# Patient Record
Sex: MALE | Race: WHITE
[De-identification: names, ages, dates, MRNs, and addresses within clinical notes are randomized per-mention and may not be internally consistent; named-entity substitution may affect disease eponyms.]

---

## 2017-11-20 ENCOUNTER — APPOINTMENT (OUTPATIENT)
Dept: FAMILY MEDICINE | Facility: CLINIC | Age: 32
End: 2017-11-20
Payer: COMMERCIAL

## 2017-11-20 VITALS
BODY MASS INDEX: 29.12 KG/M2 | SYSTOLIC BLOOD PRESSURE: 142 MMHG | WEIGHT: 215 LBS | HEIGHT: 72 IN | DIASTOLIC BLOOD PRESSURE: 90 MMHG

## 2017-11-20 PROCEDURE — 99213 OFFICE O/P EST LOW 20 MIN: CPT

## 2018-03-22 ENCOUNTER — TRANSCRIPTION ENCOUNTER (OUTPATIENT)
Age: 33
End: 2018-03-22

## 2018-03-29 ENCOUNTER — APPOINTMENT (OUTPATIENT)
Dept: FAMILY MEDICINE | Facility: CLINIC | Age: 33
End: 2018-03-29
Payer: COMMERCIAL

## 2018-03-29 VITALS
SYSTOLIC BLOOD PRESSURE: 140 MMHG | BODY MASS INDEX: 29.12 KG/M2 | HEIGHT: 72 IN | HEART RATE: 99 BPM | WEIGHT: 215 LBS | OXYGEN SATURATION: 93 % | DIASTOLIC BLOOD PRESSURE: 90 MMHG

## 2018-03-29 DIAGNOSIS — J40 BRONCHITIS, NOT SPECIFIED AS ACUTE OR CHRONIC: ICD-10-CM

## 2018-03-29 DIAGNOSIS — R05 COUGH: ICD-10-CM

## 2018-03-29 DIAGNOSIS — J98.01 ACUTE BRONCHOSPASM: ICD-10-CM

## 2018-03-29 PROCEDURE — 99214 OFFICE O/P EST MOD 30 MIN: CPT

## 2018-03-29 RX ORDER — CALCIPOTRIENE AND BETAMETHASONE DIPROPIONATE 50; .5 UG/G; MG/G
0.005-0.064 AEROSOL, FOAM TOPICAL
Qty: 60 | Refills: 0 | Status: COMPLETED | COMMUNITY
Start: 2017-11-14

## 2018-03-29 RX ORDER — PREDNISONE 20 MG/1
20 TABLET ORAL
Qty: 12 | Refills: 0 | Status: DISCONTINUED | COMMUNITY
Start: 2017-11-20 | End: 2018-03-29

## 2018-03-29 RX ORDER — HYDROCODONE BITARTRATE AND HOMATROPINE METHYLBROMIDE 5; 1.5 MG/5ML; MG/5ML
5-1.5 SOLUTION ORAL
Qty: 120 | Refills: 0 | Status: DISCONTINUED | COMMUNITY
Start: 2017-11-20 | End: 2018-03-29

## 2018-03-29 RX ORDER — AZITHROMYCIN 250 MG/1
250 TABLET, FILM COATED ORAL
Qty: 1 | Refills: 0 | Status: DISCONTINUED | COMMUNITY
Start: 2017-11-20 | End: 2018-03-29

## 2018-06-25 ENCOUNTER — TRANSCRIPTION ENCOUNTER (OUTPATIENT)
Age: 33
End: 2018-06-25

## 2018-07-06 ENCOUNTER — TRANSCRIPTION ENCOUNTER (OUTPATIENT)
Age: 33
End: 2018-07-06

## 2019-07-28 ENCOUNTER — TRANSCRIPTION ENCOUNTER (OUTPATIENT)
Age: 34
End: 2019-07-28

## 2020-01-26 ENCOUNTER — TRANSCRIPTION ENCOUNTER (OUTPATIENT)
Age: 35
End: 2020-01-26

## 2020-01-30 ENCOUNTER — TRANSCRIPTION ENCOUNTER (OUTPATIENT)
Age: 35
End: 2020-01-30

## 2022-09-24 ENCOUNTER — NON-APPOINTMENT (OUTPATIENT)
Age: 37
End: 2022-09-24

## 2024-02-23 ENCOUNTER — NON-APPOINTMENT (OUTPATIENT)
Age: 39
End: 2024-02-23

## 2024-02-23 ENCOUNTER — APPOINTMENT (OUTPATIENT)
Dept: CARDIOLOGY | Facility: CLINIC | Age: 39
End: 2024-02-23
Payer: COMMERCIAL

## 2024-02-23 VITALS — WEIGHT: 251 LBS | HEIGHT: 72 IN | BODY MASS INDEX: 34 KG/M2

## 2024-02-23 VITALS — HEART RATE: 90 BPM | OXYGEN SATURATION: 96 % | SYSTOLIC BLOOD PRESSURE: 142 MMHG | DIASTOLIC BLOOD PRESSURE: 92 MMHG

## 2024-02-23 DIAGNOSIS — R03.0 ELEVATED BLOOD-PRESSURE READING, W/OUT DIAGNOSIS OF HYPERTENSION: ICD-10-CM

## 2024-02-23 DIAGNOSIS — Z84.0 FAMILY HISTORY OF DISEASES OF THE SKIN AND SUBCUTANEOUS TISSUE: ICD-10-CM

## 2024-02-23 DIAGNOSIS — Z81.8 FAMILY HISTORY OF OTHER MENTAL AND BEHAVIORAL DISORDERS: ICD-10-CM

## 2024-02-23 DIAGNOSIS — Z87.442 PERSONAL HISTORY OF URINARY CALCULI: ICD-10-CM

## 2024-02-23 DIAGNOSIS — Z78.9 OTHER SPECIFIED HEALTH STATUS: ICD-10-CM

## 2024-02-23 DIAGNOSIS — E66.9 OBESITY, UNSPECIFIED: ICD-10-CM

## 2024-02-23 DIAGNOSIS — Z87.2 PERSONAL HISTORY OF DISEASES OF THE SKIN AND SUBCUTANEOUS TISSUE: ICD-10-CM

## 2024-02-23 PROCEDURE — 93000 ELECTROCARDIOGRAM COMPLETE: CPT

## 2024-02-23 PROCEDURE — 99204 OFFICE O/P NEW MOD 45 MIN: CPT | Mod: 25

## 2024-02-23 RX ORDER — AZITHROMYCIN 250 MG/1
250 TABLET, FILM COATED ORAL
Qty: 1 | Refills: 0 | Status: DISCONTINUED | COMMUNITY
Start: 2018-03-29 | End: 2024-02-23

## 2024-02-23 RX ORDER — BROMPHENIRAMINE MALEATE, PSEUDOEPHEDRINE HYDROCHLORIDE, 2; 30; 10 MG/5ML; MG/5ML; MG/5ML
30-2-10 SYRUP ORAL
Qty: 210 | Refills: 0 | Status: DISCONTINUED | COMMUNITY
Start: 2018-03-22 | End: 2024-02-23

## 2024-02-23 RX ORDER — MONTELUKAST 10 MG/1
10 TABLET, FILM COATED ORAL
Qty: 14 | Refills: 0 | Status: DISCONTINUED | COMMUNITY
Start: 2018-03-22 | End: 2024-02-23

## 2024-02-23 RX ORDER — ALBUTEROL SULFATE 90 UG/1
108 (90 BASE) AEROSOL, METERED RESPIRATORY (INHALATION)
Qty: 8 | Refills: 0 | Status: DISCONTINUED | COMMUNITY
Start: 2018-03-22 | End: 2024-02-23

## 2024-02-23 RX ORDER — PREDNISONE 20 MG/1
20 TABLET ORAL
Qty: 12 | Refills: 0 | Status: DISCONTINUED | COMMUNITY
Start: 2018-03-29 | End: 2024-02-23

## 2024-02-23 RX ORDER — FLUTICASONE PROPION/SALMETEROL 100-50 MCG
100-50 BLISTER, WITH INHALATION DEVICE INHALATION
Refills: 0 | Status: ACTIVE | COMMUNITY

## 2024-02-23 NOTE — PHYSICAL EXAM
[Well Developed] : well developed [Normal Conjunctiva] : normal conjunctiva [Well Nourished] : well nourished [No Acute Distress] : no acute distress [Normal S1, S2] : normal S1, S2 [Normal Venous Pressure] : normal venous pressure [No Murmur] : no murmur [No Gallop] : no gallop [Clear Lung Fields] : clear lung fields [Good Air Entry] : good air entry [Soft] : abdomen soft [No Respiratory Distress] : no respiratory distress  [Non Tender] : non-tender [Normal Bowel Sounds] : normal bowel sounds [No Edema] : no edema [Normal Gait] : normal gait [No Varicosities] : no varicosities [No Cyanosis] : no cyanosis [No Rash] : no rash [No Skin Lesions] : no skin lesions [Moves all extremities] : moves all extremities [No Focal Deficits] : no focal deficits [Normal Speech] : normal speech [Alert and Oriented] : alert and oriented [Normal memory] : normal memory

## 2024-03-12 ENCOUNTER — APPOINTMENT (OUTPATIENT)
Dept: CARDIOLOGY | Facility: CLINIC | Age: 39
End: 2024-03-12
Payer: COMMERCIAL

## 2024-03-12 PROCEDURE — 93306 TTE W/DOPPLER COMPLETE: CPT

## 2024-03-14 NOTE — DISCUSSION/SUMMARY
[FreeTextEntry1] : 38 year old man with a history of snoring after gaining weight during Covid.  There has been a concern for sleep apnea but he has never been tested nor diagnosed.  He notes recent elevations in blood pressure for several medical visits.  He notes that he has a chronic cough.  Given the elevated BP, will assess for LVH and structural heart disease with an echocardiogrm.  If evidence of LVH, will need to start BP medications. Will obtain labs as above Zepbound for weight loss and follow up in 6 months.

## 2024-03-14 NOTE — HISTORY OF PRESENT ILLNESS
[FreeTextEntry1] : 38 year old man with a history of snoring after gaining weight during Covid.  There has been a concern for sleep apnea but he has never been tested nor diagnosed.  He notes recent elevations in blood pressure for several medical visits.  He notes that he has a chronic cough.  He is active with no chest pain, dyspnea or palpitations.  He is studying to be a .  He has not been working out routinely since the onset of the pandemic.

## 2024-03-14 NOTE — REVIEW OF SYSTEMS
[Weight Gain (___ Lbs)] : [unfilled] ~Ulb weight gain [Cough] : cough [Negative] : Heme/Lymph [FreeTextEntry4] : seasonal allergies [FreeTextEntry7] : GERD [de-identified] : psoriasis

## 2024-05-06 DIAGNOSIS — I10 ESSENTIAL (PRIMARY) HYPERTENSION: ICD-10-CM

## 2024-05-06 RX ORDER — TIRZEPATIDE 2.5 MG/.5ML
2.5 INJECTION, SOLUTION SUBCUTANEOUS
Qty: 1 | Refills: 3 | Status: ACTIVE | COMMUNITY
Start: 2024-02-23 | End: 1900-01-01

## 2024-06-11 ENCOUNTER — EMERGENCY (EMERGENCY)
Facility: HOSPITAL | Age: 39
LOS: 0 days | Discharge: HOME | End: 2024-06-11
Attending: EMERGENCY MEDICINE
Payer: COMMERCIAL

## 2024-06-11 VITALS
DIASTOLIC BLOOD PRESSURE: 110 MMHG | SYSTOLIC BLOOD PRESSURE: 182 MMHG | TEMPERATURE: 98 F | RESPIRATION RATE: 18 BRPM | OXYGEN SATURATION: 99 % | HEART RATE: 107 BPM | WEIGHT: 235.01 LBS | HEIGHT: 72 IN
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
## 2024-06-11 DIAGNOSIS — I10 ESSENTIAL (PRIMARY) HYPERTENSION: ICD-10-CM

## 2024-06-11 DIAGNOSIS — R19.09 OTHER INTRA-ABDOMINAL AND PELVIC SWELLING, MASS AND LUMP: ICD-10-CM
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
## 2024-06-11 DIAGNOSIS — M62.08 SEPARATION OF MUSCLE (NONTRAUMATIC), OTHER SITE: ICD-10-CM

## 2024-06-11 PROCEDURE — 99282 EMERGENCY DEPT VISIT SF MDM: CPT

## 2024-06-11 PROCEDURE — 99053 MED SERV 10PM-8AM 24 HR FAC: CPT

## 2024-06-11 PROCEDURE — 99283 EMERGENCY DEPT VISIT LOW MDM: CPT

## 2024-06-11 NOTE — ED PROVIDER NOTE - CARE PROVIDER_API CALL
Jose Guillermo  Surgery  [address] [place] [id number]  Phone: [phone]  Fax: [phone]  Follow Up Time: 7-10 Days

## 2024-06-11 NOTE — ED PROVIDER NOTE - CLINICAL SUMMARY MEDICAL DECISION MAKING FREE TEXT BOX
Patient with diastasis recti, no signs of obstruction, no indication for imaging, labs, treatment at this time.    Will dc with surgical and PMD follow up.

## 2024-06-11 NOTE — ED PROVIDER NOTE - OBJECTIVE STATEMENT
38 yo M hx of borderline HTN, here for assessment of swelling to mid abdomen -- patient states that he was lifting something heavy at work, felt an abnormal sensation in his abdomen and now notes swelling to mid abdomen when he lays flat and flexes his abdominal muscles.    No pain, nausea, vomiting, constipation.

## 2024-06-11 NOTE — ED PROVIDER NOTE - PATIENT PORTAL LINK FT
You can access the FollowMyHealth Patient Portal offered by [facility] by registering at the following website: [email or web address]. By joining [facility]’s FollowMyHealth portal, you will also be able to view your health information using other applications (apps) compatible with our system.

## 2024-06-11 NOTE — ED PROVIDER NOTE - PHYSICAL EXAMINATION
VITAL SIGNS: I have reviewed nursing notes and confirm.  CONSTITUTIONAL: Well-developed; well-nourished; in no acute distress.  SKIN: Skin exam is warm and dry, no acute rash.  HEAD: Normocephalic; atraumatic.  EYES: PERRL, EOM intact; conjunctiva and sclera clear.  ENT: No nasal discharge; airway clear.   NECK: Supple; non tender.  CARD: S1, S2 normal; no murmurs, gallops, or rubs. Regular rate and rhythm.  RESP: No wheezes, rales or rhonchi.  ABD: Normal bowel sounds; soft; non-distended; non-tender; has visible diastasis when flexing abdomen  EXT: Normal ROM.  NEURO: Alert, oriented. Grossly unremarkable. No focal deficits.  PSYCH: Cooperative, appropriate.

## 2024-06-11 NOTE — ED PROVIDER NOTE - NSFOLLOWUPINSTRUCTIONS_ED_ALL_ED_FT
Diastasis Recti    Diastasis recti is a condition in which the muscles of the abdomen (rectus abdominis muscles) become thin and separate. The result is a wider space between the muscles of the right and left abdomen (abdominal muscles). This wider space between the muscles may cause a bulge in the middle of the abdomen. This bulge may be noticed when a person is straining or when he or she sits up after lying down.    Diastasis recti can affect men and women. It is most common among pregnant women, babies, people with obesity, and people who have had abdominal surgery. Exercise or surgery may help correct this condition.    What are the causes?  Common causes of this condition include:  Pregnancy. As the uterus grows in size, it puts pressure on the abdominal muscles, causing the muscles to separate.  Obesity. Excess fat puts pressure on abdominal muscles.  Weight lifting.  Some exercises of the abdomen.  Advanced age.  Genetics.  Having had surgery on the abdomen before.  What increases the risk?  This condition is more likely to develop in:  Women.  Newborns, especially newborns who are born early (prematurely).  What are the signs or symptoms?  Common symptoms of this condition include:  A bulge in the middle of your abdomen. You will notice it most when you sit up or strain.  Pain in your low back, hips, or the area between your hip bones (pelvis).  Constipation.  Being unable to control when you urinate (urinary incontinence).  Bloating.  Poor posture.  How is this diagnosed?  This condition is diagnosed with a physical exam. During the exam, your health care provider will ask you to lie flat on your back and do a crunch or half sit-up. If you have diastasis recti, a bulge will appear lengthwise between your abdominal muscles in the center of your abdomen. Your health care provider will measure the gap between your muscles with one of the following:  A medical device used to measure the space between two objects (caliper).  A tape measure.  CT scan.  Ultrasound.  Finger spaces. Your health care provider will measure the space using his or her fingers.  How is this treated?  If your muscle separation is not too large, you may not need treatment. However, if you are a woman who plans to become pregnant again, you should treat this condition before your next pregnancy. Treatment may include:  Physical therapy exercises to strengthen and tighten your abdominal muscles.  Lifestyle changes such as weight loss and exercise.  Over-the-counter pain medicines as needed.  Surgery to correct the separation.  Follow these instructions at home:  Activity    Return to your normal activities as told by your health care provider. Ask your health care provider what activities are safe for you.  Do exercises as told by your health care provider. Make sure you are doing your exercises and movements correctly when lifting weights or doing exercises using your abdominal muscles or the muscles in the center of your body that give stability (core muscles). Proper form can help to prevent this condition from happening again.  General instructions    If you are overweight, ask your health care provider for help with weight loss. Losing even a small amount of weight can help to improve your diastasis recti.  Take over-the-counter or prescription medicines only as told by your health care provider.  Do not strain. Straining can make the separation worse. Examples of straining include:  Pushing hard to have a bowel movement, such as when you have constipation.  Lifting heavy objects or lifting children.  Standing up and sitting down.  You may need to take these actions to prevent or treat constipation:  Drink enough fluid to keep your urine pale yellow.  Take over-the-counter or prescription medicines.  Eat foods that are high in fiber, such as beans, whole grains, and fresh fruits and vegetables.  Limit foods that are high in fat and processed sugars, such as fried or sweet foods.  Keep all follow-up visits. This is important.  Contact a health care provider if:  You notice a new bulge in your abdomen.  Get help right away if:  You experience severe discomfort in your abdomen.  You develop severe abdominal pain along with nausea, vomiting, or a fever.  Summary  Diastasis recti is a condition in which the muscles of the abdomen (rectus abdominis muscles) become thin and separate. You may notice a bulge in your abdomen because the space has widened between the muscles of the right and left abdomen.  The most common symptom is a bulge in the middle of your abdomen. You will notice it most when you sit up or strain.  This condition is diagnosed with a physical exam.  If the muscle separation is not too big, you may not need treatment. Otherwise, you may need to do physical therapy or have surgery.  This information is not intended to replace advice given to you by your health care provider. Make sure you discuss any questions you have with your health care provider.

## 2024-09-05 ENCOUNTER — APPOINTMENT (OUTPATIENT)
Dept: CARDIOLOGY | Facility: CLINIC | Age: 39
End: 2024-09-05
Payer: COMMERCIAL

## 2024-09-05 ENCOUNTER — NON-APPOINTMENT (OUTPATIENT)
Age: 39
End: 2024-09-05

## 2024-09-05 VITALS
DIASTOLIC BLOOD PRESSURE: 86 MMHG | BODY MASS INDEX: 34.4 KG/M2 | SYSTOLIC BLOOD PRESSURE: 136 MMHG | OXYGEN SATURATION: 96 % | HEART RATE: 87 BPM | WEIGHT: 254 LBS | HEIGHT: 72 IN

## 2024-09-05 DIAGNOSIS — I10 ESSENTIAL (PRIMARY) HYPERTENSION: ICD-10-CM

## 2024-09-05 PROCEDURE — 93000 ELECTROCARDIOGRAM COMPLETE: CPT

## 2024-09-05 PROCEDURE — 99214 OFFICE O/P EST MOD 30 MIN: CPT | Mod: 25

## 2024-09-05 RX ORDER — SEMAGLUTIDE 0.25 MG/.5ML
0.25 INJECTION, SOLUTION SUBCUTANEOUS
Qty: 1 | Refills: 0 | Status: ACTIVE | COMMUNITY
Start: 2024-09-05 | End: 1900-01-01

## 2024-09-05 NOTE — PHYSICAL EXAM
[Well Developed] : well developed [Well Nourished] : well nourished [No Acute Distress] : no acute distress [Normal Conjunctiva] : normal conjunctiva [Normal Venous Pressure] : normal venous pressure [Normal S1, S2] : normal S1, S2 [No Murmur] : no murmur [No Gallop] : no gallop [Clear Lung Fields] : clear lung fields [Good Air Entry] : good air entry [No Respiratory Distress] : no respiratory distress  [Soft] : abdomen soft [Non Tender] : non-tender [Normal Bowel Sounds] : normal bowel sounds [Normal Gait] : normal gait [No Edema] : no edema [No Cyanosis] : no cyanosis [No Varicosities] : no varicosities [No Rash] : no rash [No Skin Lesions] : no skin lesions [Moves all extremities] : moves all extremities [No Focal Deficits] : no focal deficits [Normal Speech] : normal speech [Alert and Oriented] : alert and oriented [Normal memory] : normal memory

## 2024-09-09 LAB
CHOLEST SERPL-MCNC: 140 MG/DL
HDLC SERPL-MCNC: 38 MG/DL
LDLC SERPL CALC-MCNC: 75 MG/DL
NONHDLC SERPL-MCNC: 103 MG/DL
TRIGL SERPL-MCNC: 161 MG/DL

## 2024-09-09 RX ORDER — LOSARTAN POTASSIUM 50 MG/1
50 TABLET, FILM COATED ORAL
Qty: 30 | Refills: 0 | Status: DISCONTINUED | COMMUNITY
Start: 2024-06-05

## 2024-09-09 NOTE — DISCUSSION/SUMMARY
[Patient] : the patient [Risks] : risks [Benefits] : benefits [Alternatives] : alternatives [FreeTextEntry1] : 39-year-old man with a history of HTN, snoring after gaining weight during Covid.  There has been a concern for sleep apnea, but he has never been tested nor diagnosed.  He currently maintains on Losartan 100 mg daily and tolerating well. Reports of feeling okay. Denies chest pain, shortness of breath, palpitations, syncope or near syncope. Compliant with medications. He is physically active, works with [employer], and bike rides 2-3x weekly. He has upcoming Umbilical Hernia Repair through [facility]. He did not receive approval for Zepbound requested early this year.   Mr. Velázquez is doing well from cardiac perspective. He's active with no complaint of chest pain/shortness of breath. He does not have lipid panel on record, will send to assess his risk. Will request for Wegovy to help with his obesity. He will continue with current meds and will f/u with Dr. Horowitz in 6 months or sooner if needed.   Patient was advised to contact the office or seek emergency medical care for any new, worsening or concerning symptoms. Patient verbalized understanding and is in agreement with the above plan.  Jon Ruth, NP [EKG obtained to assist in diagnosis and management of assessed problem(s)] : EKG obtained to assist in diagnosis and management of assessed problem(s)

## 2024-09-09 NOTE — CARDIOLOGY SUMMARY
[de-identified] :  9/5/2024: Sinus Rhythm  WITHIN NORMAL LIMITS 2/23/2024:  Sinus Rhythm  WITHIN NORMAL LIMITS

## 2024-09-09 NOTE — HISTORY OF PRESENT ILLNESS
[FreeTextEntry1] : 39-year-old man with a history of HTN, snoring after gaining weight during Covid.  There has been a concern for sleep apnea, but he has never been tested nor diagnosed.  He currently maintains on Losartan 100 mg daily and tolerating well. Reports of feeling okay. Denies chest pain, shortness of breath, palpitations, syncope or near syncope. Compliant with medications. He is physically active, works with [employer], and bike rides 2-3x weekly. He has upcoming Umbilical Hernia Repair through [facility]. He did not receive approval for Zepbound requested early this year.

## 2024-09-09 NOTE — CARDIOLOGY SUMMARY
[de-identified] :  9/5/2024: Sinus Rhythm  WITHIN NORMAL LIMITS 2/23/2024:  Sinus Rhythm  WITHIN NORMAL LIMITS

## 2024-09-09 NOTE — DISCUSSION/SUMMARY
[Patient] : the patient [Risks] : risks [Benefits] : benefits [Alternatives] : alternatives [FreeTextEntry1] : 39-year-old man with a history of HTN, snoring after gaining weight during Covid.  There has been a concern for sleep apnea, but he has never been tested nor diagnosed.  He currently maintains on Losartan 100 mg daily and tolerating well. Reports of feeling okay. Denies chest pain, shortness of breath, palpitations, syncope or near syncope. Compliant with medications. He is physically active, works with [employer], and bike rides 2-3x weekly. He has upcoming Umbilical Hernia Repair through [facility]. He did not receive approval for Zepbound requested early this year.   Mr. Velázquez is doing well from cardiac perspective. He's active with no complaint of chest pain/shortness of breath. He does not have lipid panel on record, will send to assess his risk. Will request for Wegovy to help with his obesity. He will continue with current meds and will f/u with Dr. Horowitz in 6 months or sooner if needed.   Patient was advised to contact the office or seek emergency medical care for any new, worsening or concerning symptoms. Patient verbalized understanding and is in agreement with the above plan.  Jon Ruth, NP [EKG obtained to assist in diagnosis and management of assessed problem(s)] : EKG obtained to assist in diagnosis and management of assessed problem(s)

## 2024-09-09 NOTE — HISTORY OF PRESENT ILLNESS
[FreeTextEntry1] : 39-year-old man with a history of HTN, snoring after gaining weight during Covid.  There has been a concern for sleep apnea, but he has never been tested nor diagnosed.  He currently maintains on Losartan 100 mg daily and tolerating well. Reports of feeling okay. Denies chest pain, shortness of breath, palpitations, syncope or near syncope. Compliant with medications. He is physically active, works with [employer], and bike rides 2-3x weekly. He has upcoming Umbilical Hernia Repair through [facility]. He did not receive approval for Zepbound requested early this year.

## 2024-11-08 RX ORDER — OLMESARTAN MEDOXOMIL AND HYDROCHLOROTHIAZIDE 20; 12.5 MG/1; MG/1
20-12.5 TABLET ORAL DAILY
Qty: 60 | Refills: 3 | Status: ACTIVE | COMMUNITY
Start: 2024-11-08 | End: 1900-01-01

## 2025-03-07 ENCOUNTER — APPOINTMENT (OUTPATIENT)
Dept: CARDIOLOGY | Facility: CLINIC | Age: 40
End: 2025-03-07

## 2025-03-07 ENCOUNTER — NON-APPOINTMENT (OUTPATIENT)
Age: 40
End: 2025-03-07

## 2025-03-07 VITALS
WEIGHT: 256 LBS | OXYGEN SATURATION: 95 % | BODY MASS INDEX: 34.67 KG/M2 | DIASTOLIC BLOOD PRESSURE: 78 MMHG | SYSTOLIC BLOOD PRESSURE: 118 MMHG | HEART RATE: 91 BPM | HEIGHT: 72 IN

## 2025-03-07 DIAGNOSIS — G47.33 OBSTRUCTIVE SLEEP APNEA (ADULT) (PEDIATRIC): ICD-10-CM

## 2025-03-07 DIAGNOSIS — E66.811 OBESITY, CLASS 1: ICD-10-CM

## 2025-03-07 DIAGNOSIS — M10.9 GOUT, UNSPECIFIED: ICD-10-CM

## 2025-03-07 DIAGNOSIS — I10 ESSENTIAL (PRIMARY) HYPERTENSION: ICD-10-CM

## 2025-03-07 PROCEDURE — 99213 OFFICE O/P EST LOW 20 MIN: CPT | Mod: 25

## 2025-03-07 PROCEDURE — 93000 ELECTROCARDIOGRAM COMPLETE: CPT

## 2025-03-07 RX ORDER — OLMESARTAN MEDOXOMIL 20 MG/1
20 TABLET, FILM COATED ORAL
Qty: 90 | Refills: 0 | Status: ACTIVE | COMMUNITY
Start: 2025-03-07 | End: 1900-01-01